# Patient Record
(demographics unavailable — no encounter records)

---

## 2024-12-26 NOTE — DISCUSSION/SUMMARY
[EKG obtained to assist in diagnosis and management of assessed problem(s)] : EKG obtained to assist in diagnosis and management of assessed problem(s) [FreeTextEntry1] : Impressions:  1. Paroxysmal AF: Diagnosed 5 years ago, s/p DCCV x 1. Recurrent afib with RVR 12/03/2024. Not on AC  ([id number]-FTBi score is 0). EKG performed today to assess for presence of conduction disease and reveals NSR. Discussed treatment options for afib including rate control vs antiarrhythmics vs possible ablation. We discussed the possibility of AF recurrence during pregnancy. My preference would be to treat AF (& other SVTs if present) prior to pregnancy due to the increased risk of exacerbation during pregnancy. However, Ms. Leonard's episodes have been minimal and intermittent to date. She wishes to monitor for now and defer more definitive rhythm control therapy (ie ablation). Will monitor with the Kardia device at home.  F/U in 3 months.

## 2024-12-26 NOTE — CARDIOLOGY SUMMARY
[de-identified] : 12/19/24: sinus rhythm, short NC [de-identified] : 12/02/24 CONCLUSIONS:  1. Left ventricular cavity is normal in size. Left ventricular wall thickness is normal. Left ventricular systolic function is normal with an ejection fraction of 59 % by Haque's method of disks. There are no regional wall motion abnormalities seen.  2. Normal left ventricular diastolic function, with normal left ventricular filling pressure.  3. Normal right ventricular cavity size, with normal wall thickness, and normal right ventricular systolic function.  4. Left atrium is normal in size.  5. No significant valvular disease.  6. No pericardial effusion seen.

## 2024-12-26 NOTE — DISCUSSION/SUMMARY
[EKG obtained to assist in diagnosis and management of assessed problem(s)] : EKG obtained to assist in diagnosis and management of assessed problem(s) [FreeTextEntry1] : Impressions:  1. Paroxysmal AF: Diagnosed 5 years ago, s/p DCCV x 1. Recurrent afib with RVR 12/03/2024. Not on AC  ([id number]-WIPk score is 0). EKG performed today to assess for presence of conduction disease and reveals NSR. Discussed treatment options for afib including rate control vs antiarrhythmics vs possible ablation. We discussed the possibility of AF recurrence during pregnancy. My preference would be to treat AF (& other SVTs if present) prior to pregnancy due to the increased risk of exacerbation during pregnancy. However, Ms. Leonard's episodes have been minimal and intermittent to date. She wishes to monitor for now and defer more definitive rhythm control therapy (ie ablation). Will monitor with the Kardia device at home.  F/U in 3 months.

## 2024-12-26 NOTE — CARDIOLOGY SUMMARY
[de-identified] : 12/19/24: sinus rhythm, short [place] [de-identified] : 12/02/24 CONCLUSIONS:  1. Left ventricular cavity is normal in size. Left ventricular wall thickness is normal. Left ventricular systolic function is normal with an ejection fraction of 59 % by Haque's method of disks. There are no regional wall motion abnormalities seen.  2. Normal left ventricular diastolic function, with normal left ventricular filling pressure.  3. Normal right ventricular cavity size, with normal wall thickness, and normal right ventricular systolic function.  4. Left atrium is normal in size.  5. No significant valvular disease.  6. No pericardial effusion seen.

## 2024-12-26 NOTE — HISTORY OF PRESENT ILLNESS
[FreeTextEntry1] : Cecily Leonard is a 35-year-old woman PMH of paroxysmal atrial fibrillation diagnosed 5 years ago, s/p DCCV x 1 at that time, admitted to [facility] 12/03/2024 with recurrent symptomatic atrial fibrillation w/RVR to low 100's. Prior to this presentation, she had intermittent palpitations since her initial cardioversion but they were usually self-limiting. She was prn beta blockers.  Not on anticoagulation (JHO2VU9-GXTo score is 0). Only took Xarelto for one month post DCCV.  Her symptoms on 12/03 lasted longer and she eventually took Labetalol and came to the ED. She was found to be in atrial fibrillation with RVR in the low 100's. Was given cardizem IV push. Patient has been in her usual state of good health.  EP consulted for recurrent atrial fibrillation however, during the exam, she self-converted to sinus rhythm 80-90's. Patient was discharged on Metoprolol 25mg daily. Since then patient feels well, no evidence of afib, she self-monitors with [employer]. She reports wanting to become pregnant this year and has concerns with afib management during pregnancy. Denies chest pain, palpitations, SOB, syncope or near syncope.

## 2024-12-26 NOTE — HISTORY OF PRESENT ILLNESS
[FreeTextEntry1] : Cecily Leonard is a 35-year-old woman PMH of paroxysmal atrial fibrillation diagnosed 5 years ago, s/p DCCV x 1 at that time, admitted to [facility] 12/03/2024 with recurrent symptomatic atrial fibrillation w/RVR to low 100's. Prior to this presentation, she had intermittent palpitations since her initial cardioversion but they were usually self-limiting. She was prn beta blockers.  Not on anticoagulation (LWY9ST4-AEQh score is 0). Only took Xarelto for one month post DCCV.  Her symptoms on 12/03 lasted longer and she eventually took Labetalol and came to the ED. She was found to be in atrial fibrillation with RVR in the low 100's. Was given cardizem IV push. Patient has been in her usual state of good health.  EP consulted for recurrent atrial fibrillation however, during the exam, she self-converted to sinus rhythm 80-90's. Patient was discharged on Metoprolol 25mg daily. Since then patient feels well, no evidence of afib, she self-monitors with [employer] wicho. She reports wanting to become pregnant this year and has concerns with afib management during pregnancy. Denies chest pain, palpitations, SOB, syncope or near syncope.

## 2024-12-26 NOTE — DISCUSSION/SUMMARY
[EKG obtained to assist in diagnosis and management of assessed problem(s)] : EKG obtained to assist in diagnosis and management of assessed problem(s) [FreeTextEntry1] : Impressions:  1. Paroxysmal AF: Diagnosed 5 years ago, s/p DCCV x 1. Recurrent afib with RVR 12/03/2024. Not on AC  (ZCH6CM0-KWKg score is 0). EKG performed today to assess for presence of conduction disease and reveals NSR. Discussed treatment options for afib including rate control vs antiarrhythmics vs possible ablation. We discussed the possibility of AF recurrence during pregnancy. My preference would be to treat AF (& other SVTs if present) prior to pregnancy due to the increased risk of exacerbation during pregnancy. However, Ms. Leonard's episodes have been minimal and intermittent to date. She wishes to monitor for now and defer more definitive rhythm control therapy (ie ablation). Will monitor with the Kardia device at home.  F/U in 3 months.

## 2024-12-26 NOTE — HISTORY OF PRESENT ILLNESS
[FreeTextEntry1] : Cecily Leonard is a 35-year-old woman PMH of paroxysmal atrial fibrillation diagnosed 5 years ago, s/p DCCV x 1 at that time, admitted to [facility] 12/03/2024 with recurrent symptomatic atrial fibrillation w/RVR to low 100's. Prior to this presentation, she had intermittent palpitations since her initial cardioversion but they were usually self-limiting. She was prn beta blockers.  Not on anticoagulation (OCS3WP5-GZRv score is 0). Only took Xarelto for one month post DCCV.  Her symptoms on 12/03 lasted longer and she eventually took Labetalol and came to the ED. She was found to be in atrial fibrillation with RVR in the low 100's. Was given cardizem IV push. Patient has been in her usual state of good health.  EP consulted for recurrent atrial fibrillation however, during the exam, she self-converted to sinus rhythm 80-90's. Patient was discharged on Metoprolol 25mg daily. Since then patient feels well, no evidence of afib, she self-monitors with [employer] wicho. She reports wanting to become pregnant this year and has concerns with afib management during pregnancy. Denies chest pain, palpitations, SOB, syncope or near syncope.

## 2024-12-26 NOTE — CARDIOLOGY SUMMARY
[de-identified] : 12/19/24: sinus rhythm, short [place] [de-identified] : 12/02/24 CONCLUSIONS:  1. Left ventricular cavity is normal in size. Left ventricular wall thickness is normal. Left ventricular systolic function is normal with an ejection fraction of 59 % by Haque's method of disks. There are no regional wall motion abnormalities seen.  2. Normal left ventricular diastolic function, with normal left ventricular filling pressure.  3. Normal right ventricular cavity size, with normal wall thickness, and normal right ventricular systolic function.  4. Left atrium is normal in size.  5. No significant valvular disease.  6. No pericardial effusion seen.

## 2025-03-20 NOTE — HISTORY OF PRESENT ILLNESS
[FreeTextEntry1] : Cecily Rios is a 35-year-old woman PMH of paroxysmal atrial fibrillation diagnosed 5 years ago, s/p DCCV x 1 at that time, admitted to [facility] 12/03/2024 with recurrent symptomatic atrial fibrillation w/RVR to low 100's. Prior to this presentation, she had intermittent palpitations since her initial cardioversion but they were usually self-limiting. She was prn beta blockers.  Not on anticoagulation (IIM8UL5-PJNh score is 0). Only took Xarelto for one month post DCCV.  Her symptoms on 12/03 lasted longer and she eventually took Labetalol and came to the ED. She was found to be in atrial fibrillation with RVR in the low 100's. Was given cardizem IV push. Patient has been in her usual state of good health.  EP consulted for recurrent atrial fibrillation however, during the exam, she self-converted to sinus rhythm 80-90's. Patient was discharged on Metoprolol 25mg daily. She followed up 12/19 and felt well at that time with no evidence of afib, she self-monitors with [employer].  She reports wanting to become pregnant this year and has concerns with afib management during pregnancy. Since last visit, she found out she was pregnant in December and switched to labetalol but miscarried in January and went back to metoprolol 25 mg daily.  She reports she went out to dinner and had a glass of wine on 2/19/25 and states she had PAF for approx 1/2 hour the morning after. She felt the palpitations and it was recorded on [employer]. No episodes since. She is not on oral a/c as (CJB9NJ0-FTXc score is 0). Otherwise, she is active and has no symptoms of dyspnea with exertion.  Denies chest pain, SOB, syncope or near syncope.

## 2025-03-20 NOTE — CARDIOLOGY SUMMARY
[de-identified] : 3/20/25 sinus rhythm 69, short NH 12/19/24: sinus rhythm, short NH [de-identified] : 12/02/24 CONCLUSIONS:  1. Left ventricular cavity is normal in size. Left ventricular wall thickness is normal. Left ventricular systolic function is normal with an ejection fraction of 59 % by Haque's method of disks. There are no regional wall motion abnormalities seen.  2. Normal left ventricular diastolic function, with normal left ventricular filling pressure.  3. Normal right ventricular cavity size, with normal wall thickness, and normal right ventricular systolic function.  4. Left atrium is normal in size.  5. No significant valvular disease.  6. No pericardial effusion seen.

## 2025-03-20 NOTE — DISCUSSION/SUMMARY
[EKG obtained to assist in diagnosis and management of assessed problem(s)] : EKG obtained to assist in diagnosis and management of assessed problem(s) [FreeTextEntry1] : Impressions:  1. Paroxysmal AF: Diagnosed 5 years ago, s/p DCCV x 1. Recurrent afib with RVR 12/03/2024. Not on AC  (TFT6NS2-TCJs score is 0). EKG performed today to assess for presence of conduction disease and reveals NSR with short SD. Discussed treatment options for afib including rate control vs antiarrhythmics vs possible ablation. We discussed the possibility of AF recurrence during pregnancy. My preference would be to treat AF (& other SVTs if present) prior to pregnancy due to the increased risk of exacerbation during pregnancy. However, Ms. Londono's episodes have been minimal and intermittent to date with last 2/20/25 in the setting of having alcohol the evening before.  She wishes to monitor for now and defer more definitive rhythm control therapy (ie ablation). Will monitor with the Kardia device at home.  F/U in office for any change in symptoms

## 2025-05-28 NOTE — FAMILY HISTORY
[FreeTextEntry1] : FamilyHistory_20_twCiteListControlStart FamilyHistory_20_twCiteListControlEnd [id number]  A four-generation family history was constructed and scanned into [employer].  Family history is significant for:  maternal grandmother hx CHF  paternal uncle dec 40s from [place] paternal uncle dec SIDS  her [facility] and paternal families originate from  No Ashkenazi Lutheran ancestry.  Family history was negative for consanguinity   + family history of SIDS

## 2025-05-28 NOTE — PLAN
[TextEntry] : 1.	Verbal  Consent obtained for the combined cardiac sequencing and Del/Dup panel 138 genes (#835)  . A consent form will be emailed to the patient and upon receipt of the signed consent form. .name  can go to any [facility] lab for a blood draw and should bring a copy of the signed form [email or web address] 2.	 The blood sample will be sent to [facility] for analysis, pending insurance authorization.  3.	A follow-up appointment was scheduled in 2-3 months to discuss genetic testing results in person. Results generally return in 6-8 weeks.  For any additional questions please call  Pepper Naidu MS, BREANNA or Dinh Rao MD, PhD at [phone] I spent 60 minutes on the encounter  Patient seen with Pepper Naidu MS, BREANNA, board certified genetic counsellor

## 2025-05-28 NOTE — HISTORY OF PRESENT ILLNESS
[FreeTextEntry1] : TAMMIE SOLORZANO is a 36 yo F PMH early onset Afib , aroxysmal atrial fibrillation diagnosed 5 years ago, s/p DCCV x 1 at that time, admitted to [facility] 12/03/2024 with recurrent symptomatic atrial fibrillation w/RVR to low 100's. Prior to this presentation, she had intermittent palpitations since her initial cardioversion but they were usually self-limiting.  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

## 2025-05-28 NOTE — REASON FOR VISIT
[Other Location: e.g. School (Enter Location, City,State)___] : at [unfilled], at the time of the visit. [Medical Office: ([facility])___] : at the medical office located in  [Telehealth (audio & video)] : This visit was provided via telehealth using real-time 2-way audio visual technology. [Verbal consent obtained from patient] : the patient, [unfilled] [FreeTextEntry3] : Dear Dr. De León         . I saw your patient TAMMIE SOLORZANO on 05/28/2025 . Please see the note below for the assessment and plan.   TAMMIE SOLORZANO  was seen  for an initial consultation at the Cardiogenomics Program at [facility] on 05/28/2025.   Ms. SOLORZANO was referred by  ALESSIA DE LEÓN MD;  for hereditary cardiac predisposition risk assessment and counseling, due to early onset Afib

## 2025-05-28 NOTE — SIGNATURES
[TextEntry] : Dinh Rao MD, PhD  Medical Director Program for Cardiac Genetics, Genomics and Precision Medicine Department of Cardiology [facility]  [employer] at [address] [place] [address] Tel: [phone] Fax: [phone]  ([facility] office) [address], 3rd floor (between [address]) [place] [address] (p) [phone] (f) [phone]

## 2025-05-28 NOTE — DATA REVIEWED
Home health orders routed to [facility] via [employer].   [FreeTextEntry1] : ECG: 3/20/25 sinus rhythm 69, short [place] 12/19/24: sinus rhythm, short [place]   Echo: 12/02/24 CONCLUSIONS:  1. Left ventricular cavity is normal in size. Left ventricular wall thickness is normal. Left ventricular systolic function is normal with an ejection fraction of 59 % by Haque's method of disks. There are no regional wall motion abnormalities seen.  2. Normal left ventricular diastolic function, with normal left ventricular filling pressure.  3. Normal right ventricular cavity size, with normal wall thickness, and normal right ventricular systolic function.  4. Left atrium is normal in size.  5. No significant valvular disease.  6. No pericardial effusion seen.

## 2025-05-28 NOTE — ASSESSMENT
[TextEntry] : TAMMIE SOLORZANO  is a 35 year F  with a history of  early onset Afib , aroxysmal atrial fibrillation diagnosed 5 years ago, s/p DCCV x 1 at that time, admitted to [facility] 12/03/2024 with recurrent symptomatic atrial fibrillation w/RVR to low 100's. Prior to this presentation, she had intermittent palpitations since her initial cardioversion but they were usually self-limiting. admits to hx palpitations in her teens. The differences between hereditary and sporadic arrhythmia  were reviewed with the patient.  She  has elected to undergo genetic testing due to concerns for  personal history, definitive diagnosis, disease management, family history, concern for the health of family members . Results may change medical management for the patient and may affect the healthcare of other family members. She  expressed understanding of the presented information and satisfaction with having all of Her questions and concerns addressed

## 2025-05-28 NOTE — DISCUSSION/SUMMARY
[TextEntry] : We reviewed the risks, benefits, limitations, and implications of genetic testing.  Additionally, we examined the patients motivation for testing, and the emotional ramifications of the test results.  The patient is aware that results may impact family members.  Counseling resources are available if requested.   DANIELLE, the Genetic Information Non-discrimination Act protects most people from discrimination in health insurance and employment at firms with over 50 employees.  DANIELLE does not protect against use of genetic information by life insurance, disability, or long-term care insurers.  Further information on other limitations is available at www.[email or web address].   After a review of testing options, the patient elected to the  combined cardiac panel offered through [employer]. . Panels contain 138 genes associated with varying levels of risk for***. We reviewed the three possible results for each gene on this panel: positive, negative and variant of uncertain significance (VUS).  We discussed that panel testing could result in incidental findings, such as identifying a positive result in a gene with cardiac risks not seen in the current personal or discussed family history. If results are positive, we would discuss the  risks and management options associated with that cardiac condition susceptibility gene and discuss genetic testing for family members.  Pedigree was reviewed with the patient to identify those who should be tested if the patient is positive.  If results are negative or a VUS is identified, the patient would continue to be managed based on personal and family history of their  cardiac condition.